# Patient Record
Sex: FEMALE
[De-identification: names, ages, dates, MRNs, and addresses within clinical notes are randomized per-mention and may not be internally consistent; named-entity substitution may affect disease eponyms.]

---

## 2023-05-17 ENCOUNTER — DASHBOARD ENCOUNTERS (OUTPATIENT)
Age: 83
End: 2023-05-17

## 2023-05-17 ENCOUNTER — CLAIMS CREATED FROM THE CLAIM WINDOW (OUTPATIENT)
Dept: URBAN - METROPOLITAN AREA CLINIC 113 | Facility: CLINIC | Age: 83
End: 2023-05-17
Payer: MEDICARE

## 2023-05-17 ENCOUNTER — LAB OUTSIDE AN ENCOUNTER (OUTPATIENT)
Dept: URBAN - METROPOLITAN AREA CLINIC 113 | Facility: CLINIC | Age: 83
End: 2023-05-17

## 2023-05-17 VITALS
RESPIRATION RATE: 18 BRPM | DIASTOLIC BLOOD PRESSURE: 46 MMHG | HEART RATE: 62 BPM | WEIGHT: 160.8 LBS | HEIGHT: 55 IN | TEMPERATURE: 97.3 F | BODY MASS INDEX: 37.21 KG/M2 | SYSTOLIC BLOOD PRESSURE: 121 MMHG

## 2023-05-17 DIAGNOSIS — R10.32 LEFT LOWER QUADRANT PAIN: ICD-10-CM

## 2023-05-17 DIAGNOSIS — R10.32 LEFT GROIN PAIN: ICD-10-CM

## 2023-05-17 DIAGNOSIS — M79.605 LEFT LEG PAIN: ICD-10-CM

## 2023-05-17 PROCEDURE — 99214 OFFICE O/P EST MOD 30 MIN: CPT | Performed by: INTERNAL MEDICINE

## 2023-05-17 RX ORDER — METOPROLOL TARTRATE 50 MG/1
1 TABLET WITH FOOD TABLET, FILM COATED ORAL TWICE A DAY
Status: ACTIVE | COMMUNITY

## 2023-05-17 RX ORDER — PRAVASTATIN SODIUM 40 MG/1
1 TABLET TABLET ORAL ONCE A DAY
Status: ACTIVE | COMMUNITY

## 2023-05-17 RX ORDER — AMLODIPINE BESYLATE 5 MG/1
1 TABLET TABLET ORAL ONCE A DAY
Status: ACTIVE | COMMUNITY

## 2023-05-17 RX ORDER — ONDANSETRON 4 MG/1
1 TABLET ON THE TONGUE AND ALLOW TO DISSOLVE TABLET, ORALLY DISINTEGRATING ORAL ONCE A DAY
Status: ACTIVE | COMMUNITY

## 2023-05-17 RX ORDER — FAMOTIDINE 20 MG/1
1 TABLET AT BEDTIME AS NEEDED TABLET, FILM COATED ORAL ONCE A DAY
Status: ACTIVE | COMMUNITY

## 2023-05-17 NOTE — HPI-TODAY'S VISIT:
Patient presents today for initial evaluation.  She reports she is having diverticulitis flare.  She states that she has had diverticulitis in the past.  Sounds like she has CT scan 5 years ago.  She has had some intermittent episodes since that time which have been treated with antibiotics.  She started have an episode a few weeks ago with left lower quadrant pain.  Really describes left groin pain.  She describes radiation into her leg.  She is actually wearing a lidocaine patch on her thigh.  Pain on the left side of her upper leg as well.  She does acknowledge on further questioning this is different than diverticulitis that she has had in the past.  She did not have a CT scan done.  She had started some Levaquin but was having some kind of reaction to it is discontinued it.  She has not had any blood in her stools or any bowel movement changes.  She has not had any fever.

## 2023-05-17 NOTE — PHYSICAL EXAM GASTROINTESTINAL
Abdomen is soft, mildly tender in the LLQ but more so in the L inguinal area. nondistended , no rebound or guarding. No organomegaly appreciated

## 2023-07-28 ENCOUNTER — OFFICE VISIT (OUTPATIENT)
Dept: URBAN - METROPOLITAN AREA CLINIC 113 | Facility: CLINIC | Age: 83
End: 2023-07-28